# Patient Record
Sex: MALE | Race: OTHER | Employment: FULL TIME | URBAN - METROPOLITAN AREA
[De-identification: names, ages, dates, MRNs, and addresses within clinical notes are randomized per-mention and may not be internally consistent; named-entity substitution may affect disease eponyms.]

---

## 2019-06-10 ENCOUNTER — HOSPITAL ENCOUNTER (EMERGENCY)
Facility: HOSPITAL | Age: 39
Discharge: HOME | End: 2019-06-10
Attending: EMERGENCY MEDICINE
Payer: COMMERCIAL

## 2019-06-10 VITALS
TEMPERATURE: 98 F | HEIGHT: 71 IN | BODY MASS INDEX: 23.8 KG/M2 | OXYGEN SATURATION: 98 % | DIASTOLIC BLOOD PRESSURE: 76 MMHG | SYSTOLIC BLOOD PRESSURE: 123 MMHG | WEIGHT: 170 LBS | HEART RATE: 86 BPM | RESPIRATION RATE: 17 BRPM

## 2019-06-10 DIAGNOSIS — K85.90 ACUTE PANCREATITIS, UNSPECIFIED COMPLICATION STATUS, UNSPECIFIED PANCREATITIS TYPE: Primary | ICD-10-CM

## 2019-06-10 PROCEDURE — 85025 COMPLETE CBC W/AUTO DIFF WBC: CPT | Performed by: EMERGENCY MEDICINE

## 2019-06-10 PROCEDURE — 80048 BASIC METABOLIC PNL TOTAL CA: CPT | Performed by: EMERGENCY MEDICINE

## 2019-06-10 PROCEDURE — 96361 HYDRATE IV INFUSION ADD-ON: CPT

## 2019-06-10 PROCEDURE — 81001 URINALYSIS AUTO W/SCOPE: CPT | Performed by: EMERGENCY MEDICINE

## 2019-06-10 PROCEDURE — 96374 THER/PROPH/DIAG INJ IV PUSH: CPT

## 2019-06-10 PROCEDURE — 80076 HEPATIC FUNCTION PANEL: CPT | Performed by: EMERGENCY MEDICINE

## 2019-06-10 PROCEDURE — 99285 EMERGENCY DEPT VISIT HI MDM: CPT

## 2019-06-10 PROCEDURE — 83690 ASSAY OF LIPASE: CPT | Performed by: EMERGENCY MEDICINE

## 2019-06-10 RX ORDER — METOCLOPRAMIDE HYDROCHLORIDE 5 MG/ML
5 INJECTION INTRAMUSCULAR; INTRAVENOUS ONCE
Status: COMPLETED | OUTPATIENT
Start: 2019-06-10 | End: 2019-06-10

## 2019-06-10 NOTE — ED INITIAL ASSESSMENT (HPI)
Pt states \"pain over my pancreas, vomiting, and one episode of syncope this AM.\" Pt states he was laying down when he \"passed out. \"Pt states he may have food poisoning because he had grilled crab legs yesterday.

## 2019-06-12 NOTE — ED PROVIDER NOTES
Patient Seen in: [facility] AND [facility] Emergency Department    History   Patient presents with:  Abdomen/Flank Pain (GI/)    Stated Complaint: abd pain, syncope, vomiting since this AM    HPI    42-year-old male presents for evaluation of abdominal pain. Pulmonary/Chest: Effort normal and breath sounds normal. No respiratory distress. Abdominal: Soft. Bowel sounds are normal. There is no tenderness. Musculoskeletal: Normal range of motion.    Neurological: He is alert and oriented to person, place, an 1201)         Procedures: None    Re-Evaluation: Repeat examination after second liter of fluid patient reports feeling much better, no longer lightheaded while at rest or with ambulation, tolerating fluids without difficulty. Denies pain.   Would like to precautions. Patient verbalizes understanding of and agreement with this plan, would like to be discharged home and agrees to return precautions. Further Outpatient evaluation and treatment will be required.  I personally discussed the results of the

## 2020-08-14 ENCOUNTER — HOSPITAL ENCOUNTER (EMERGENCY)
Facility: HOSPITAL | Age: 40
Discharge: HOME | End: 2020-08-14
Attending: EMERGENCY MEDICINE
Payer: COMMERCIAL

## 2020-08-14 ENCOUNTER — APPOINTMENT (OUTPATIENT)
Dept: GENERAL RADIOLOGY | Facility: HOSPITAL | Age: 40
End: 2020-08-14
Attending: EMERGENCY MEDICINE
Payer: COMMERCIAL

## 2020-08-14 VITALS
SYSTOLIC BLOOD PRESSURE: 110 MMHG | HEART RATE: 79 BPM | TEMPERATURE: 98 F | DIASTOLIC BLOOD PRESSURE: 71 MMHG | OXYGEN SATURATION: 98 % | RESPIRATION RATE: 20 BRPM

## 2020-08-14 DIAGNOSIS — R09.81 SINUS CONGESTION: Primary | ICD-10-CM

## 2020-08-14 PROCEDURE — 71045 X-RAY EXAM CHEST 1 VIEW: CPT | Performed by: EMERGENCY MEDICINE

## 2020-08-14 PROCEDURE — 99283 EMERGENCY DEPT VISIT LOW MDM: CPT

## 2020-08-14 RX ORDER — FLUTICASONE PROPIONATE 50 MCG
2 SPRAY, SUSPENSION (ML) NASAL DAILY
Qty: 16 G | Refills: 0 | Status: SHIPPED | OUTPATIENT
Start: 2020-08-14 | End: 2020-09-13

## 2020-08-14 NOTE — ED INITIAL ASSESSMENT (HPI)
Pt c/o SOB since this morning. Pt states he used a vape pen last night and felt as though \"I can't take a deep breath. \" Pt speaking in complete sentences, no respiratory distress noted.  Denies CP/HA/dizziness

## 2020-08-14 NOTE — ED PROVIDER NOTES
Patient Seen in: [facility] [place] AND [facility] Emergency Department      History   Patient presents with:  Dyspnea LISA SOB    Stated Complaint: difficulty taking a breath in     HPI    Patient is a 58-year-old male who presents with shortness of breath especially wi display      MDM     Pulse ox 98% Ra, normal  d/w patient possibility of sinusitis, ELSIE, chronic sinus issues from anatomy. Recommend ENT f/u, flonase and returning for difficulty breathing. Pt in no respiratory distress at this time.  Speaking in full sent

## 2022-08-28 ENCOUNTER — HOSPITAL ENCOUNTER (EMERGENCY)
Facility: HOSPITAL | Age: 42
Discharge: LEFT BEFORE DISCHARGE | End: 2022-08-28
Payer: COMMERCIAL

## 2022-08-28 VITALS
OXYGEN SATURATION: 97 % | RESPIRATION RATE: 20 BRPM | DIASTOLIC BLOOD PRESSURE: 76 MMHG | SYSTOLIC BLOOD PRESSURE: 116 MMHG | TEMPERATURE: 99 F | HEART RATE: 108 BPM

## 2022-08-28 PROCEDURE — 93005 ELECTROCARDIOGRAM TRACING: CPT

## 2022-08-28 PROCEDURE — 93010 ELECTROCARDIOGRAM REPORT: CPT | Performed by: INTERNAL MEDICINE

## 2023-12-13 ENCOUNTER — APPOINTMENT (OUTPATIENT)
Dept: OTHER | Facility: HOSPITAL | Age: 43
End: 2023-12-13
Attending: EMERGENCY MEDICINE

## (undated) NOTE — LETTER
Date & Time: 6/10/2019, 2:25 PM  Patient: Elena Layton  Encounter Provider(s):    Jose Easley MD       To Whom It May Concern:    [facility] was seen and treated in our department on 6/10/2019. He can return to work.     If you have any question

## (undated) NOTE — ED AVS SNAPSHOT
[facility]   MRN: [id number]    Department:  [facility] Emergency Department   Date of Visit:  6/10/2019           Disclosure     Insurance plans vary and the physician(s) referred by the ER may not be covered by your plan.  Please contact yo CARE PHYSICIAN AT ONCE OR RETURN IMMEDIATELY TO THE EMERGENCY DEPARTMENT. If you have been prescribed any medication(s), please fill your prescription right away and begin taking the medication(s) as directed.   If you believe that any of the medications